# Patient Record
Sex: FEMALE | Race: WHITE
[De-identification: names, ages, dates, MRNs, and addresses within clinical notes are randomized per-mention and may not be internally consistent; named-entity substitution may affect disease eponyms.]

---

## 2019-09-27 ENCOUNTER — HOSPITAL ENCOUNTER (OUTPATIENT)
Dept: HOSPITAL 92 - BICMRI | Age: 34
Discharge: HOME | End: 2019-09-27
Payer: COMMERCIAL

## 2019-09-27 DIAGNOSIS — M47.26: Primary | ICD-10-CM

## 2019-09-27 DIAGNOSIS — M51.17: ICD-10-CM

## 2019-09-27 PROCEDURE — 72100 X-RAY EXAM L-S SPINE 2/3 VWS: CPT

## 2019-09-27 PROCEDURE — 72148 MRI LUMBAR SPINE W/O DYE: CPT

## 2019-09-27 NOTE — MRI
MRI of lumbar spine without contrast:

9/27/2019



COMPARISON: None



HISTORY: Lumbar radiculopathy, low back pain radiating down both legs, left greater than right



TECHNIQUE: Multiplanar multisequence MR imaging of the lumbar spine is obtained without contrast



FINDINGS: On the basis of 5 lumbar type vertebral bodies, the conus medullaris terminates at T12-L1.



The sagittal STIR imaging demonstrates no focal area of osseous marrow edema.



T12-L1: Intervertebral disc height and signal intensity is within normal limits with no significant c
entral canal or neural foraminal stenosis.



L1-2: Intervertebral disc height and signal intensity is within normal limits with no central canal o
r neural foraminal stenosis.



L2-3: Intervertebral disc height and signal intensity is within normal limits. No central canal or ne
ural foraminal stenosis.



L3-4: Intervertebral disc height and signal intensity within normal limits. No significant central ca
nal or neural foraminal stenosis.



L4-5: Intervertebral disc height and signal intensity within normal limits with no significant centra
l canal or neural foraminal stenosis.



L5-S1: There is disc space narrowing, disc desiccation, and degenerative endplate change. There is mi
ld disc bulge. Facet hypertrophy and mild foraminal disc protrusion in the foraminal region on the

left.



There is mild fluid signal intensity within the facet joint on the left at L5-S1. There is minimal le
ft lateral recess stenosis and mild left neural foraminal stenosis.



The imaged retroperitoneal structures demonstrate no acute findings.



IMPRESSION: Degenerative disc disease at L5-S1 as detailed above, left greater than right.



Reported By: Josh Robles 

Electronically Signed:  9/27/2019 10:45 AM

## 2019-09-27 NOTE — RAD
EXAM:  XR Lumbar Spine Comp W Bending



DATE:  9/27/2019 12:00 AM



INDICATION:  Mechanical back pain and lumbar radiculopathy



COMPARISON:  Lumbar spinal radiograph dated September 9, 2008



FINDING:  There is worsening disc disc degenerative disease at L5-S1. This is moderate in severity. T
he lateral flexion and extension radiographs and straight no abnormal translational motion. No pars

defect is evident. There is mild facet osteoarthrosis at L4-5. No acute fracture is evident.





IMPRESSION:Worsening moderate disc degenerative disease at L5-S1 with mild facet osteoarthrosis at L4
-5. No abnormal translational motion.



Reported By: Pardeep Almanzar 

Electronically Signed:  9/27/2019 10:28 AM